# Patient Record
Sex: FEMALE | Race: WHITE
[De-identification: names, ages, dates, MRNs, and addresses within clinical notes are randomized per-mention and may not be internally consistent; named-entity substitution may affect disease eponyms.]

---

## 2019-12-19 ENCOUNTER — HOSPITAL ENCOUNTER (EMERGENCY)
Dept: HOSPITAL 25 - UCEAST | Age: 24
Discharge: HOME | End: 2019-12-19
Payer: COMMERCIAL

## 2019-12-19 VITALS — DIASTOLIC BLOOD PRESSURE: 73 MMHG | SYSTOLIC BLOOD PRESSURE: 114 MMHG

## 2019-12-19 DIAGNOSIS — R10.9: ICD-10-CM

## 2019-12-19 DIAGNOSIS — R68.83: ICD-10-CM

## 2019-12-19 DIAGNOSIS — Z86.19: ICD-10-CM

## 2019-12-19 DIAGNOSIS — R19.7: Primary | ICD-10-CM

## 2019-12-19 PROCEDURE — 87493 C DIFF AMPLIFIED PROBE: CPT

## 2019-12-19 PROCEDURE — 87899 AGENT NOS ASSAY W/OPTIC: CPT

## 2019-12-19 PROCEDURE — G0463 HOSPITAL OUTPT CLINIC VISIT: HCPCS

## 2019-12-19 PROCEDURE — 87045 FECES CULTURE AEROBIC BACT: CPT

## 2019-12-19 PROCEDURE — 87077 CULTURE AEROBIC IDENTIFY: CPT

## 2019-12-19 PROCEDURE — 87209 SMEAR COMPLEX STAIN: CPT

## 2019-12-19 PROCEDURE — 99201: CPT

## 2019-12-19 PROCEDURE — 82270 OCCULT BLOOD FECES: CPT

## 2019-12-19 PROCEDURE — 87046 STOOL CULTR AEROBIC BACT EA: CPT

## 2019-12-19 PROCEDURE — 87177 OVA AND PARASITES SMEARS: CPT

## 2019-12-19 PROCEDURE — 87328 CRYPTOSPORIDIUM AG IA: CPT

## 2019-12-19 PROCEDURE — 87329 GIARDIA AG IA: CPT

## 2019-12-19 PROCEDURE — 83630 LACTOFERRIN FECAL (QUAL): CPT

## 2019-12-19 NOTE — UC
Abdominal Pain Female HPI





- HPI Summary


HPI Summary: 


Patient is a 25yo female presenting with abdominal pain and diarrhea x3 days. 

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 

the past several months.  Patient states that she was diagnosed with an amoeba 

3 months ago in September and treated appropriately.  Patient states that she 

was given "an antibiotic of some sort for one week back in September and her 

symptoms resolved."  States that the amoeba was laboratory confirmed.  Patient 

states similar symptoms began 3 days ago including intermittent cramping that 

comes with chills and then "loose stools."  Denies pain or worsening with 

eating.  Denies blood in the stool.  Denies mucus in stool.  Denies any visible 

worms in the stool.  Denies painful defecation.  Denies nausea and vomiting.  

Denies urinary symptoms.  States she is eating and drinking normally.  She 

denies fevers and fatigue.  She does state that Imodium helps relieve symptoms.

  Denies abdominal pain currently.








- History of Current Complaint


Stated Complaint: ABD PAIN, GI ISSUE


Hx Obtained From: Patient


Onset/Duration: Sudden Onset, Lasting Days


Allergies/Adverse Reactions: 


 Allergies











Allergy/AdvReac Type Severity Reaction Status Date / Time


 


No Known Allergies Allergy   Verified 12/19/19 17:56











Home Medications: 


 Home Medications





NK [No Home Medications Reported]  12/19/19 [History Confirmed 12/19/19]











PMH/Surg Hx/FS Hx/Imm Hx


Previously Healthy: Yes


GI/ History: Other - h/o amoeba infection 09/19 while in [facility] in [employer]





- Family History


Known Family History: Positive: Non-Contributory





- Social History


Alcohol Use: None


Substance Use Type: None


Smoking Status (MU): Never Smoked Tobacco





Review of Systems


All Other Systems Reviewed And Are Negative: Yes


Constitutional: Positive: Chills


Skin: Positive: Negative


ENT: Positive: Negative


Respiratory: Positive: Negative


Cardiovascular: Positive: Negative


Gastrointestinal: Positive: Abdominal Pain - intermittent "abdominal cramping", 

Diarrhea - "loose stool".  Negative: Vomiting, Nausea


Genitourinary: Positive: Negative


Musculoskeletal: Positive: Negative.  Negative: Myalgia


Neurological: Positive: Negative





Physical Exam


Triage Information Reviewed: Yes


Appearance: Well-Appearing, No Pain Distress, Well-Nourished


Vital Signs: 





 Vital Signs (72 hours)











  12/19/19





  17:47


 


Temperature 98.6 F


 


Pulse Rate 65


 


Respiratory 16





Rate 


 


Blood Pressure 114/73





(mmHg) 


 


O2 Sat by Pulse 97





Oximetry 











Vital Signs Reviewed: Yes


Eyes: Positive: Conjunctiva Clear


ENT: Positive: Hearing grossly normal


Neck: Positive: Supple


Respiratory Exam: Normal


Respiratory: Positive: Lungs clear, Normal breath sounds, No respiratory 

distress, No accessory muscle use.  Negative: Crackles, Rhonchi, Stridor, 

Wheezing


Cardiovascular Exam: Normal


Cardiovascular: Positive: RRR


Abdomen Description: Positive: No Organomegaly, Soft.  Negative: Nontender - 

mild tenderness to palpation of umbilical region, CVA Tenderness (R), CVA 

Tenderness (L), Distended, Guarding, McBurney's Point Tenderness


Bowel Sounds: Positive: Present - BS x4


Neurological: Positive: Alert


Psychological: Positive: Age Appropriate Behavior


Skin Exam: Normal - no erythema or ecchymosis





Abd Pain Female Course/Dx





- Course


Course Of Treatment: 


Patient brought her stool sample from home. Sent stool for culture, including O&

P screen with microscopic, c. diff, and lactoferrin. I spoke with micro lab and 

they said they would make note to include microscopic with screen for the 

patient since the order was unavailable in my computer. Informed patient that 

she would be notified with any positive results warranting treatment. 

Instructed patient to continue with symptomatic treatment and to go to ED with 

any new or worsening symptoms. Patient VS normal and in no pain distress.








- Differential Dx/Diagnosis


Provider Diagnosis: 


 Acute diarrhea, Central abdominal pain








Discharge ED





- Sign-Out/Discharge


Documenting (check all that apply): Patient Departure


All imaging exams completed and their final reports reviewed: No Studies





- Discharge Plan


Condition: Stable


Disposition: HOME


Patient Education Materials:  Acute Diarrhea (ED), Acute Abdominal Pain (ED)


Additional Instructions: 


As discussed, your stool has been sent for culture. You will be notified with 

any positive results warranting treatment.


You may continue to take imodium as directed. 


Increase your fluid and fiber intake. 


Go to the emergency room if you experience any new or worsening symptoms, 

including severe abdominal pain, fever, nausea and vomiting, or blood in the 

stool. 





- Billing Disposition and Condition


Condition: STABLE


Disposition: Home

## 2023-10-13 ENCOUNTER — NON-APPOINTMENT (OUTPATIENT)
Age: 28
End: 2023-10-13

## 2023-11-01 PROBLEM — Z00.00 ENCOUNTER FOR PREVENTIVE HEALTH EXAMINATION: Status: ACTIVE | Noted: 2023-11-01

## 2023-11-02 ENCOUNTER — APPOINTMENT (OUTPATIENT)
Dept: TRANSGENDER CARE | Facility: CLINIC | Age: 28
End: 2023-11-02